# Patient Record
Sex: FEMALE | Race: WHITE | Employment: STUDENT | URBAN - METROPOLITAN AREA
[De-identification: names, ages, dates, MRNs, and addresses within clinical notes are randomized per-mention and may not be internally consistent; named-entity substitution may affect disease eponyms.]

---

## 2017-05-25 ENCOUNTER — HOSPITAL ENCOUNTER (OUTPATIENT)
Dept: PHYSICAL THERAPY | Age: 22
Discharge: HOME | End: 2017-05-25
Payer: COMMERCIAL

## 2017-05-25 PROCEDURE — 97161 PT EVAL LOW COMPLEX 20 MIN: CPT

## 2017-05-25 PROCEDURE — 97110 THERAPEUTIC EXERCISES: CPT

## 2017-05-25 NOTE — PROGRESS NOTES
Ros Del Valle  : 1995 [facility]  [address] [facility], [address] [place] [address],[address], [facility] [address].  [phone]   [phone]       OUTPATIENT PHYSICAL THERAPY:Initial Assessment 2017      ICD-10: Treatment Diagnosis: Pain in right shoulder (M25.511)  Precautions/Allergies:   none  Fall Risk Score: 0 (? 5 = High Risk)  MD Orders: evaluate and treat, HEP, ROM strengthening MEDICAL/REFERRING DIAGNOSIS:  adina shoulder/ core/ eccentric/ adina scapula   DATE OF ONSET: 2017  REFERRING PHYSICIAN: Ren Galvan MD  RETURN PHYSICIAN APPOINTMENT: not known     INITIAL ASSESSMENT:  Ms. Tonya Beck presents with complaints of R shoulder pain after playing softball at college. Pain, rotator cuff and scapula weakness are limiting normalized use and function of Right UE. She will benefit from PT for guided shoulder rehab to promote return to normalized use of the UE with recreational activities. PROBLEM LIST (Impacting functional limitations):  1. Pain right shoulder  2. Weakness right shoulder INTERVENTIONS PLANNED:  1. Thermal and electric modalities, manual therapies for pain   2. Manual therapies, therapeutic exercises, HEP for ROM    3. Therapeutic exercises and HEP for strength   TREATMENT PLAN:  Effective Dates: 17 TO 17. Frequency/Duration: 2 times a week for 4-6 weeks  GOALS: (Goals have been discussed and agreed upon with patient.)  Discharge Goals: Time Frame: 4-6 weeks  1. She will be independent with HEP. 2. She will increase R UE strength to 5/5 with manual muscle testing to return to softball. 3. She will report pain 0/10 with recreational activities. Rehabilitation Potential For Stated Goals: Good  Regarding Donna Mathias's therapy, I certify that the treatment plan above will be carried out by a therapist or under their direction.   Thank you for this referral,      Kellen Napoles, PT       Referring Physician Signature: Ren Mccarthy., MD              Date                      HISTORY:   History of Present Injury/Illness (Reason for Referral):  Ms. Nancy Baptiste plays softball in college and reports shoulder pain that started around December/janaury then started to get worse in February. No injury to the shoulder. Shoulder only seemed to bother her with throwing and was a sharp pain during the throw and then would be a dull pain. She did go see an orthopedic doctor and he referred her to physical therapy. She did not throw for ~3 weeks and then went back to throwing and softball games. Pain was in the anterior shoulder but felt deep inside. .   Past Medical History/Comorbidities:   Ms. Nancy Baptiste  has no past medical history on file. Ms. Nancy Baptiste  has no past surgical history on file. Social History/Living Environment:    Lives with mom and dad  Prior Level of Function/Work/Activity:  Plays softball at [employer] in [facility]. She plays short stop or 2nd base. Season started in January but they did have a few practices in the fall. 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    Dominant Side:         RIGHT  Current Medications:     escitalopram    Date Last Reviewed:  5-25-17   Number of Personal Factors/Comorbidities that affect the Plan of Care: 1-2: MODERATE COMPLEXITY   EXAMINATION:   Observation/Orthostatic Postural Assessment: Pt sits with slightly rounded shoulders     Palpation:No tenderness around the R shoulder    ROM:      LUE AROM  L Shoulder Internal Rotation: 90 (at 90 deg abd, T5 behind back)  L Shoulder External Rotation: 100 (at 90 deg abd)  LUE PROM  L Shoulder Internal Rotation: 85  L Shoulder External Rotation: 115 (at 90 deg abd)    RUE AROM  R Shoulder Internal Rotation: 80 (at 90 deg abd, T6 behind back)  R Shoulder External Rotation: 100  RUE PROM  R Shoulder Internal Rotation: 90  R Shoulder External Rotation: 80 (at neutral, 120 at 90 deg abd)         Strength:   LUE Strength  L Scapular Depression: 5  L Shoulder ADduction: 5  L Shoulder Internal Rotation: 5  L Shoulder External Rotation: 5    RUE Strength  R Scapular Depression: 4- (lower trap)  R Shoulder Flexion: 5  R Shoulder Extension: 5 (prone)  R Shoulder ABduction: 4+  R Shoulder ADduction: 4 (middle trap)  R Shoulder Internal Rotation: 5  R Shoulder External Rotation: 5   subscap lift off 4+      Special Tests:  Impingement tests: negative santa-german, negative Neers; Rotator Cuff tests: negative full can, negative empty can, negative subscap lift off; Labral test: negative O'Brians, negative bicep load test  Functional Mobility:  Sit to/from Stand: independent. Bed Mobility: independent. Car Transfer: indendent. Independent with basic mobility. independent with dressing and grooming ADL's. Body Structures Involved:  1. Joints  2. Muscles Body Functions Affected:  1. Neuromusculoskeletal Activities and Participation Affected:  1. Community, Social and [place]   Number of elements (examined above) that affect the Plan of Care: 3: MODERATE COMPLEXITY   CLINICAL PRESENTATION:   Presentation: Stable and uncomplicated: LOW COMPLEXITY   CLINICAL DECISION MAKING:   Outcome Measure: Tool Used: Disabilities of the Arm, Shoulder and Hand (DASH) Questionnaire - Quick Version  Score:  Initial: 12/55  Most Recent: X/55 (Date: -- )   Interpretation of Score: The DASH is designed to measure the activities of daily living in person's with upper extremity dysfunction or pain. Each section is scored on a 1-5 scale, 5 representing the greatest disability. The scores of each section are added together for a total score of 55. Medical Necessity:   · Patient is expected to demonstrate progress in strength to improve shoulder stability for return to softball. Reason for Services/Other Comments:  · Patient continues to require skilled intervention due to R shoulder pain, decreased UE strength.    Use of outcome tool(s) and clinical judgement create a POC that gives a: Clear prediction of patient's progress: LOW COMPLEXITY            TREATMENT:   (In addition to Assessment/Re-Assessment sessions the following treatments were rendered)  Pre-treatment Symptoms/Complaints:  Pt reports shoulder pain with throwing the ball. Pain: Initial:     0/10, at worst 6-7/10 Post Session:  0/10       Therapeutic Exercise (15 Minutes):  Exercises to improve strength. Required moderate visual, verbal and tactile cues to promote proper body alignment. Instructed in HEP with prone I's, T's, and Y's 2x10 ea with bilateral UE. R shoulder prone ER 1# 2x10, prone IR 1# 2x10. Eccentric shoulder ER supine with green t-band 30x. Serratus punch supine with green band 2x15. Issued green tubing for HEP. Manual Therapy (     ): none  Therapeutic Modalities:                                                                                             HEP: Provided written HEP for the above exercises. patient verbalizes understanding. Treatment/Session Assessment:    · Response to Treatment:  Good return demonstration of HEP. · Compliance with Program/Exercises: Will assess as treatment progresses. · Recommendations/Intent for next treatment session: \"Next visit will focus on rotator cuff and scapular strengthening\".   Total Treatment Duration: 50 minutes  PT Patient Time In/Time Out  Time In: 1350  Time Out: [address]

## 2017-05-25 NOTE — PROGRESS NOTES
Ambulatory/Rehab Services H2 Model Falls Risk Assessment    Risk Factor Pts. ·   Confusion/Disorientation/Impulsivity  []    4 ·   Symptomatic Depression  []   2 ·   Altered Elimination  []   1 ·   Dizziness/Vertigo  []   1 ·   Gender (Male)  []   1 ·   Any administered antiepileptics (anticonvulsants):  []   2 ·   Any administered benzodiazepines:  []   1 ·   Visual Impairment (specify):  []   1 ·   Portable Oxygen Use  []   1 ·   Orthostatic ? BP  []   1 ·   History of Recent Falls (within 3 mos.)  []   5     Ability to Rise from Chair (choose one) Pts. ·   Ability to rise in a single movement  [x]   0 ·   Pushes up, successful in one attempt  []   1 ·   Multiple attempts, but successful  []   3 ·   Unable to rise without assistance  []   4   Total: (5 or greater = High Risk) 0     Falls Prevention Plan:   []                Physical Limitations to Exercise (specify):   []                Mobility Assistance Device (type):   []                Exercise/Equipment Adaptation (specify):    ©2010 [facility] [address] Patent #[id number].  Federal Law prohibits the replication, distribution or use without written permission from [facility] [employer]

## 2017-05-31 ENCOUNTER — HOSPITAL ENCOUNTER (OUTPATIENT)
Dept: PHYSICAL THERAPY | Age: 22
Discharge: HOME | End: 2017-05-31
Payer: COMMERCIAL

## 2017-05-31 PROCEDURE — 97110 THERAPEUTIC EXERCISES: CPT

## 2017-05-31 NOTE — PROGRESS NOTES
Brittany Anderson  : 1995 [facility] at [facility]  [address] [facility], [address] [place] [address],[address], [facility] [address].  [phone]   [phone]       OUTPATIENT PHYSICAL THERAPY:Daily Note 2017      ICD-10: Treatment Diagnosis: Pain in right shoulder (M25.511)  Precautions/Allergies:   none  Fall Risk Score: 0 (? 5 = High Risk)  MD Orders: evaluate and treat, HEP, ROM strengthening MEDICAL/REFERRING DIAGNOSIS:  adina shoulder/ core/ eccentric/ adina scapula   DATE OF ONSET: 2017  REFERRING PHYSICIAN: Ester Mendoza MD  RETURN PHYSICIAN APPOINTMENT: not known     INITIAL ASSESSMENT:  Ms. Areli Smith presents with complaints of R shoulder pain after playing softball at college. Pain, rotator cuff and scapula weakness are limiting normalized use and function of Right UE. She will benefit from PT for guided shoulder rehab to promote return to normalized use of the UE with recreational activities. PROBLEM LIST (Impacting functional limitations):  1. Pain right shoulder  2. Weakness right shoulder INTERVENTIONS PLANNED:  1. Thermal and electric modalities, manual therapies for pain   2. Manual therapies, therapeutic exercises, HEP for ROM    3. Therapeutic exercises and HEP for strength   TREATMENT PLAN:  Effective Dates: 17 TO 17. Frequency/Duration: 2 times a week for 4-6 weeks  GOALS: (Goals have been discussed and agreed upon with patient.)  Discharge Goals: Time Frame: 4-6 weeks  1. She will be independent with HEP. 2. She will increase R UE strength to 5/5 with manual muscle testing to return to softball. 3. She will report pain 0/10 with recreational activities. Rehabilitation Potential For Stated Goals: Good                HISTORY:   History of Present Injury/Illness (Reason for Referral):  Ms. Areli Smith plays softball in college and reports shoulder pain that started around December/ then started to get worse in February. No injury to the shoulder.  Shoulder only seemed to bother her with throwing and was a sharp pain during the throw and then would be a dull pain. She did go see an orthopedic doctor and he referred her to physical therapy. She did not throw for ~3 weeks and then went back to throwing and softball games. Pain was in the anterior shoulder but felt deep inside. .   Past Medical History/Comorbidities:   Ms. Lata Field  has no past medical history on file. Ms. Lata Field  has no past surgical history on file. Social History/Living Environment:    Lives with mom and dad  Prior Level of Function/Work/Activity:  Plays softball at [employer] in [facility]. She plays short stop or 2nd base. Season started in January but they did have a few practices in the fall. 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 Dominant Side:         RIGHT  Current Medications:     escitalopram    Date Last Reviewed:  5-31-17   Number of Personal Factors/Comorbidities that affect the Plan of Care: 1-2: MODERATE COMPLEXITY   EXAMINATION:   Observation/Orthostatic Postural Assessment: Pt sits with slightly rounded shoulders     Palpation:No tenderness around the R shoulder    ROM:                     Strength:            subscap lift off 4+      Special Tests: AT EVAL: Impingement tests: negative santa-german, negative Neers; Rotator Cuff tests: negative full can, negative empty can, negative subscap lift off; Labral test: negative O'Brians, negative bicep load test   CLINICAL DECISION MAKING:   Outcome Measure: Tool Used: Disabilities of the Arm, Shoulder and Hand (DASH) Questionnaire - Quick Version  Score:  Initial: 12/55  Most Recent: X/55 (Date: -- )   Interpretation of Score: The DASH is designed to measure the activities of daily living in person's with upper extremity dysfunction or pain. Each section is scored on a 1-5 scale, 5 representing the greatest disability. The scores of each section are added together for a total score of 55. Medical Necessity:   · Patient is expected to demonstrate progress in strength to improve shoulder stability for return to softball. Reason for Services/Other Comments:  · Patient continues to require skilled intervention due to R shoulder pain, decreased UE strength. Use of outcome tool(s) and clinical judgement create a POC that gives a: Clear prediction of patient's progress: LOW COMPLEXITY            TREATMENT:   (In addition to Assessment/Re-Assessment sessions the following treatments were rendered)  Pre-treatment Symptoms/Complaints:  Pt reports no pain in the shoulder. Pain: Initial:     0/10 Post Session:  0/10       Therapeutic Exercise (40 Minutes):  Exercises to improve strength. Required moderate visual, verbal and tactile cues to promote proper body alignment. Date:  5-31-17 Date:   Date:     Activity/Exercise Parameters Parameters Parameters   Prone I's 1#2x15     Prone T's 2x15     Prone Y's' 2x15     Prone ER and IR 1#15 2#15 ea     Serratus on wall 2x10     Y's on wall with lift off Red 2x15     Eccentric ER at 90 deg abd Green 2x15     IR at 90 deg abd Blue 2x15     Rhythmic stabilization Ball dribbles on wall 30x4     IR and ER at neutral Green 2x15 ea         Manual Therapy (     ): none  Therapeutic Modalities:                                                                                             HEP: Provided written HEP for the above exercises. patient verbalizes understanding. Treatment/Session Assessment:    · Response to Treatment: Fatigued in shoulder muscles at end of treatment. No complaints of pain during treatment. · Compliance with Program/Exercises: Will assess as treatment progresses. · Recommendations/Intent for next treatment session: \"Next visit will focus on rotator cuff and scapular strengthening\".   Total Treatment Duration: 40 minutes  PT Patient Time In/Time Out  Time In: 0945  Time Out: [address], PT

## 2017-06-01 ENCOUNTER — HOSPITAL ENCOUNTER (OUTPATIENT)
Dept: PHYSICAL THERAPY | Age: 22
Discharge: HOME | End: 2017-06-01
Payer: COMMERCIAL

## 2017-06-01 PROCEDURE — 97110 THERAPEUTIC EXERCISES: CPT

## 2017-06-01 NOTE — PROGRESS NOTES
Ashwini Bell  : 1995 [facility] at [facility]  [address] [facility], [address] [place] [address],[address], [facility] [address]  [phone]   [phone]       OUTPATIENT PHYSICAL THERAPY:Daily Note 2017      ICD-10: Treatment Diagnosis: Pain in right shoulder (M25.511)  Precautions/Allergies:   none  Fall Risk Score: 0 (? 5 = High Risk)  MD Orders: evaluate and treat, HEP, ROM strengthening MEDICAL/REFERRING DIAGNOSIS:  adina shoulder/ core/ eccentric/ adina scapula   DATE OF ONSET: 2017  REFERRING PHYSICIAN: Sarthak Chowdary MD  RETURN PHYSICIAN APPOINTMENT: not known     INITIAL ASSESSMENT:  Ms. Abelardo Krishnan presents with complaints of R shoulder pain after playing softball at college. Pain, rotator cuff and scapula weakness are limiting normalized use and function of Right UE. She will benefit from PT for guided shoulder rehab to promote return to normalized use of the UE with recreational activities. PROBLEM LIST (Impacting functional limitations):  1. Pain right shoulder  2. Weakness right shoulder INTERVENTIONS PLANNED:  1. Thermal and electric modalities, manual therapies for pain   2. Manual therapies, therapeutic exercises, HEP for ROM    3. Therapeutic exercises and HEP for strength   TREATMENT PLAN:  Effective Dates: 17 TO 17. Frequency/Duration: 2 times a week for 4-6 weeks  GOALS: (Goals have been discussed and agreed upon with patient.)  Discharge Goals: Time Frame: 4-6 weeks  1. She will be independent with HEP. 2. She will increase R UE strength to 5/5 with manual muscle testing to return to softball. 3. She will report pain 0/10 with recreational activities. Rehabilitation Potential For Stated Goals: Good                HISTORY:   History of Present Injury/Illness (Reason for Referral):  Ms. Abelardo Krishnan plays softball in college and reports shoulder pain that started around December/ then started to get worse in February. No injury to the shoulder.  Shoulder only seemed to bother her with throwing and was a sharp pain during the throw and then would be a dull pain. She did go see an orthopedic doctor and he referred her to physical therapy. She did not throw for ~3 weeks and then went back to throwing and softball games. Pain was in the anterior shoulder but felt deep inside. .   Past Medical History/Comorbidities:   Ms. Sanjiv Cantu  has no past medical history on file. Ms. Sanjiv Cantu  has no past surgical history on file. Social History/Living Environment:    Lives with mom and dad  Prior Level of Function/Work/Activity:  Plays softball at [employer] in [facility]. She plays short stop or 2nd base. Season started in January but they did have a few practices in the fall. 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 Dominant Side:         RIGHT  Current Medications:     escitalopram    Date Last Reviewed:  5-31-17   Number of Personal Factors/Comorbidities that affect the Plan of Care: 1-2: MODERATE COMPLEXITY   EXAMINATION:   Observation/Orthostatic Postural Assessment: Pt sits with slightly rounded shoulders     Palpation:No tenderness around the R shoulder    ROM:                     Strength:            subscap lift off 4+      Special Tests: AT EVAL: Impingement tests: negative santa-german, negative Neers; Rotator Cuff tests: negative full can, negative empty can, negative subscap lift off; Labral test: negative O'Brians, negative bicep load test   CLINICAL DECISION MAKING:   Outcome Measure: Tool Used: Disabilities of the Arm, Shoulder and Hand (DASH) Questionnaire - Quick Version  Score:  Initial: 12/55  Most Recent: X/55 (Date: -- )   Interpretation of Score: The DASH is designed to measure the activities of daily living in person's with upper extremity dysfunction or pain. Each section is scored on a 1-5 scale, 5 representing the greatest disability. The scores of each section are added together for a total score of 55. Medical Necessity:   · Patient is expected to demonstrate progress in strength to improve shoulder stability for return to softball. Reason for Services/Other Comments:  · Patient continues to require skilled intervention due to R shoulder pain, decreased UE strength. Use of outcome tool(s) and clinical judgement create a POC that gives a: Clear prediction of patient's progress: LOW COMPLEXITY            TREATMENT:   (In addition to Assessment/Re-Assessment sessions the following treatments were rendered)  Pre-treatment Symptoms/Complaints:  Pt reports no pain or soreness in the shoulder. Pain: Initial:     0/10 Post Session:  0/10       Therapeutic Exercise (38 Minutes):  Exercises to improve strength. Required moderate visual, verbal and tactile cues to promote proper body alignment. Active isolated stretch to B hip flexors in angela test position 3'x10 ea   Date:  5-31-17 Date:  6-1-17 Date:     Activity/Exercise Parameters Parameters Parameters   Prone I's 1#2x15     Prone T's 2x15 With ball drops 2x30    Prone Y's' 2x15 With ball drops 2x30    Prone ER and IR 1#15 2#15 ea With ball drops 2x30    Serratus strengthening 2x10 on wall plank + on floor 2x10    Y's on wall with lift off Red 2x15 -    Eccentric ER at 90 deg abd Green 2x15 -    IR at 90 deg abd Blue 2x15 -    Rhythmic stabilization Ball dribbles on wall 30x4 Ball dribbles on wall 30x4    IR and ER at neutral Green 2x15 ea -    superman - 30\"x2    core - Side to side with yellow ball 2x20    Side lying ER - Green ball 2x20              Manual Therapy (     ): none  Therapeutic Modalities:                                                                                             HEP: Provided written HEP for the above exercises. patient verbalizes understanding. Treatment/Session Assessment:    · Response to Treatment: Fatigues quickly with scapula strengthening exercises. She has good form with exercises.    · Compliance with Program/Exercises: Will assess as treatment progresses. · Recommendations/Intent for next treatment session: \"Next visit will focus on rotator cuff and scapular strengthening\".   Total Treatment Duration:38 minutes  PT Patient Time In/Time Out  Time In: 1020  Time Out: [address]

## 2017-06-05 ENCOUNTER — APPOINTMENT (OUTPATIENT)
Dept: PHYSICAL THERAPY | Age: 22
End: 2017-06-05
Payer: COMMERCIAL

## 2017-06-07 ENCOUNTER — HOSPITAL ENCOUNTER (OUTPATIENT)
Dept: PHYSICAL THERAPY | Age: 22
Discharge: HOME | End: 2017-06-07
Payer: COMMERCIAL

## 2017-06-07 PROCEDURE — 97110 THERAPEUTIC EXERCISES: CPT

## 2017-06-07 NOTE — PROGRESS NOTES
Josue Romero  : 1995 [facility]  [address] [facility], [address] [place] [address],[address], [address] [facility] [place]  [phone]   [phone]       OUTPATIENT PHYSICAL THERAPY:Daily Note 2017      ICD-10: Treatment Diagnosis: Pain in right shoulder (M25.511)  Precautions/Allergies:   none  Fall Risk Score: 0 (? 5 = High Risk)  MD Orders: evaluate and treat, HEP, ROM strengthening MEDICAL/REFERRING DIAGNOSIS:  adina shoulder/ core/ eccentric/ adina scapula   DATE OF ONSET: 2017  REFERRING PHYSICIAN: Lopez Valentin MD  RETURN PHYSICIAN APPOINTMENT: not known     INITIAL ASSESSMENT:  Ms. Amy Luz presents with complaints of R shoulder pain after playing softball at college. Pain, rotator cuff and scapula weakness are limiting normalized use and function of Right UE. She will benefit from PT for guided shoulder rehab to promote return to normalized use of the UE with recreational activities. PROBLEM LIST (Impacting functional limitations):  1. Pain right shoulder  2. Weakness right shoulder INTERVENTIONS PLANNED:  1. Thermal and electric modalities, manual therapies for pain   2. Manual therapies, therapeutic exercises, HEP for ROM    3. Therapeutic exercises and HEP for strength   TREATMENT PLAN:  Effective Dates: 17 TO 17. Frequency/Duration: 2 times a week for 4-6 weeks  GOALS: (Goals have been discussed and agreed upon with patient.)  Discharge Goals: Time Frame: 4-6 weeks  1. She will be independent with HEP. 2. She will increase R UE strength to 5/5 with manual muscle testing to return to softball. 3. She will report pain 0/10 with recreational activities. Rehabilitation Potential For Stated Goals: Good                HISTORY:   History of Present Injury/Illness (Reason for Referral):  Ms. Amy Luz plays softball in college and reports shoulder pain that started around December/ then started to get worse in February. No injury to the shoulder.  Shoulder only seemed to bother her with throwing and was a sharp pain during the throw and then would be a dull pain. She did go see an orthopedic doctor and he referred her to physical therapy. She did not throw for ~3 weeks and then went back to throwing and softball games. Pain was in the anterior shoulder but felt deep inside. .   Past Medical History/Comorbidities:   Ms. Donis Pickett  has no past medical history on file. Ms. Donis Pickett  has no past surgical history on file. Social History/Living Environment:    Lives with mom and dad  Prior Level of Function/Work/Activity:  Plays softball at "[employer] in [facility]. She plays short stop or 2nd base. Season started in January but they did have a few practices in the fall. 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 Dominant Side:         RIGHT  Current Medications:     escitalopram    Date Last Reviewed:  6-7-17   Number of Personal Factors/Comorbidities that affect the Plan of Care: 1-2: MODERATE COMPLEXITY   EXAMINATION:   Observation/Orthostatic Postural Assessment: Pt sits with slightly rounded shoulders     Palpation:No tenderness around the R shoulder    ROM:                     Strength:                  Special Tests: AT EVAL: Impingement tests: negative santa-german, negative Neers; Rotator Cuff tests: negative full can, negative empty can, negative subscap lift off; Labral test: negative O'Brians, negative bicep load test   CLINICAL DECISION MAKING:   Outcome Measure: Tool Used: Disabilities of the Arm, Shoulder and Hand (DASH) Questionnaire - Quick Version  Score:  Initial: 12/55  Most Recent: X/55 (Date: -- )   Interpretation of Score: The DASH is designed to measure the activities of daily living in person's with upper extremity dysfunction or pain. Each section is scored on a 1-5 scale, 5 representing the greatest disability. The scores of each section are added together for a total score of 55.       Medical Necessity: · Patient is expected to demonstrate progress in strength to improve shoulder stability for return to softball. Reason for Services/Other Comments:  · Patient continues to require skilled intervention due to R shoulder pain, decreased UE strength. Use of outcome tool(s) and clinical judgement create a POC that gives a: Clear prediction of patient's progress: LOW COMPLEXITY            TREATMENT:   (In addition to Assessment/Re-Assessment sessions the following treatments were rendered)  Pre-treatment Symptoms/Complaints:  Pt reports the shoulder has not been bothering her. Pain: Initial:     0/10 Post Session:  0/10       Therapeutic Exercise (40 Minutes):  Exercises to improve strength. Required moderate visual, verbal and tactile cues to promote proper body alignment. Date:  5-31-17 Date:  6-1-17 Date:  6-7-17   Activity/Exercise Parameters Parameters Parameters   Prone I's 1#2x15  1#2x15   Prone T's 2x15 With ball drops 2x30 1#2x15   Prone Y's' 2x15 With ball drops 2x30 1#2x15   Prone ER and IR 1#15 2#15 ea With ball drops 2x30 2#2x15 ea  3#15 ea   Serratus strengthening 2x10 on wall plank + on floor 2x10 5#2x15   Y's on wall with lift off Red 2x15 - Red 2x16   Eccentric ER at 90 deg abd Green 2x15 - Green 2x15   IR at 90 deg abd Blue 2x15 - Blue 2x15   Rhythmic stabilization Ball dribbles on wall 30x4 Ball dribbles on wall 30x4 Ball dribbls on wall 30x4   IR and ER at neutral Green 2x15 ea -    superman - 30\"x2    core - Side to side with yellow ball 2x20 Side to side with yellow ball 2x20; plank side to side 10   Side lying ER - Green ball 2x20 2# 15x, 10   Diagonals on calbles - - Flex 3#10ea  Ext 7# 10 ea       Manual Therapy (     ): none  Therapeutic Modalities:                                                                                             HEP: Provided written HEP for the above exercises. patient verbalizes understanding.     Treatment/Session Assessment:    · Response to Treatment: No complaints of pain with exercises. · Compliance with Program/Exercises: complaint  · Recommendations/Intent for next treatment session: \"Next visit will focus on rotator cuff and scapular strengthening\".   Total Treatment Duration:40 minutes  PT Patient Time In/Time Out  Time In: 1100  Time Out: [address] 5

## 2017-06-08 ENCOUNTER — HOSPITAL ENCOUNTER (OUTPATIENT)
Dept: PHYSICAL THERAPY | Age: 22
Discharge: HOME | End: 2017-06-08
Payer: COMMERCIAL

## 2017-06-08 PROCEDURE — 97110 THERAPEUTIC EXERCISES: CPT

## 2017-06-08 NOTE — PROGRESS NOTES
Josue Romero  : 1995 [facility]  [address] [facility], [address] [place] [address],[address], [facility] [address]  [phone]   [phone]       OUTPATIENT PHYSICAL THERAPY:Daily Note 2017      ICD-10: Treatment Diagnosis: Pain in right shoulder (M25.511)  Precautions/Allergies:   none  Fall Risk Score: 0 (? 5 = High Risk)  MD Orders: evaluate and treat, HEP, ROM strengthening MEDICAL/REFERRING DIAGNOSIS:  adina shoulder/ core/ eccentric/ adina scapula   DATE OF ONSET: 2017  REFERRING PHYSICIAN: Lopez Valentin MD  RETURN PHYSICIAN APPOINTMENT: not known     INITIAL ASSESSMENT:  Ms. Amy Luz presents with complaints of R shoulder pain after playing softball at college. Pain, rotator cuff and scapula weakness are limiting normalized use and function of Right UE. She will benefit from PT for guided shoulder rehab to promote return to normalized use of the UE with recreational activities. PROBLEM LIST (Impacting functional limitations):  1. Pain right shoulder  2. Weakness right shoulder INTERVENTIONS PLANNED:  1. Thermal and electric modalities, manual therapies for pain   2. Manual therapies, therapeutic exercises, HEP for ROM    3. Therapeutic exercises and HEP for strength   TREATMENT PLAN:  Effective Dates: 17 TO 17. Frequency/Duration: 2 times a week for 4-6 weeks  GOALS: (Goals have been discussed and agreed upon with patient.)  Discharge Goals: Time Frame: 4-6 weeks  1. She will be independent with HEP. 2. She will increase R UE strength to 5/5 with manual muscle testing to return to softball. 3. She will report pain 0/10 with recreational activities. Rehabilitation Potential For Stated Goals: Good                HISTORY:   History of Present Injury/Illness (Reason for Referral):  Ms. Amy Luz plays softball in college and reports shoulder pain that started around December/ then started to get worse in February. No injury to the shoulder.  Shoulder only seemed to bother her with throwing and was a sharp pain during the throw and then would be a dull pain. She did go see an orthopedic doctor and he referred her to physical therapy. She did not throw for ~3 weeks and then went back to throwing and softball games. Pain was in the anterior shoulder but felt deep inside. .   Past Medical History/Comorbidities:   Ms. Nancy Baptiste  has no past medical history on file. Ms. Nancy Baptiste  has no past surgical history on file. Social History/Living Environment:    Lives with mom and dad  Prior Level of Function/Work/Activity:  Plays softball at [employer] in [facility]. She plays short stop or 2nd base. Season started in January but they did have a few practices in the fall. 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 Dominant Side:         RIGHT  Current Medications:     escitalopram    Date Last Reviewed:  6-7-17   Number of Personal Factors/Comorbidities that affect the Plan of Care: 1-2: MODERATE COMPLEXITY   EXAMINATION:   Observation/Orthostatic Postural Assessment: Pt sits with slightly rounded shoulders     Palpation:No tenderness around the R shoulder    ROM:                     Strength:                  Special Tests: AT EVAL: Impingement tests: negative santa-german, negative Neers; Rotator Cuff tests: negative full can, negative empty can, negative subscap lift off; Labral test: negative O'Brians, negative bicep load test   CLINICAL DECISION MAKING:   Outcome Measure: Tool Used: Disabilities of the Arm, Shoulder and Hand (DASH) Questionnaire - Quick Version  Score:  Initial: 12/55  Most Recent: X/55 (Date: -- )   Interpretation of Score: The DASH is designed to measure the activities of daily living in person's with upper extremity dysfunction or pain. Each section is scored on a 1-5 scale, 5 representing the greatest disability. The scores of each section are added together for a total score of 55.       Medical Necessity: · Patient is expected to demonstrate progress in strength to improve shoulder stability for return to softball. Reason for Services/Other Comments:  · Patient continues to require skilled intervention due to R shoulder pain, decreased UE strength. Use of outcome tool(s) and clinical judgement create a POC that gives a: Clear prediction of patient's progress: LOW COMPLEXITY            TREATMENT:   (In addition to Assessment/Re-Assessment sessions the following treatments were rendered)  Pre-treatment Symptoms/Complaints:  Pt reports no soreness in the shoulder today. Pain: Initial:     0/10 Post Session:  0/10       Therapeutic Exercise (40 Minutes):  Exercises to improve strength. Required moderate visual, verbal and tactile cues to promote proper body alignment.     Date:  5-31-17 Date:  6-1-17 Date:  6-7-17 Date  6-8-17   Activity/Exercise Parameters Parameters Parameters parameters   Prone I's 1#2x15  1#2x15    Prone T's 2x15 With ball drops 2x30 1#2x15 With ball drops 2x30   Prone Y's' 2x15 With ball drops 2x30 1#2x15 With ball drops 2x30   Prone ER and IR 1#15 2#15 ea With ball drops 2x30 2#2x15 ea  3#15 ea Green ball drops ER 3x20   Serratus strengthening 2x10 on wall plank + on floor 2x10 5#2x15 Plank with hip/knee flex 2x10   Y's on wall with lift off Red 2x15 - Red 2x16    Eccentric ER at 90 deg abd Green 2x15 - Green 2x15 Green 2x15   IR at 90 deg abd Blue 2x15 - Blue 2x15 Green 2x15 at 90 flex   Rhythmic stabilization [employer] on wall 30x4 Ball dribbles on wall 30x4 Ball dribbls on wall 30x4 Ball dribbles on wall 30x4   IR and ER at neutral Green 2x15 ea -     superman - 30\"x2     core - Side to side with yellow ball 2x20 Side to side with yellow ball 2x20; plank side to side 10 Slides side to side in plank; side plank 20\"x2   Side lying ER - Green ball 2x20 2# 15x, 10 Green ball 2x20   Diagonals on cables - - Flex 3#10ea  Ext 7# 10 ea MR 2x10    D2 extension eccentric with toss - - - 2x10 Manual Therapy (     ): none  Therapeutic Modalities:                                                                                             HEP: Provided written HEP for the above exercises. patient verbalizes understanding. Treatment/Session Assessment:    · Response to Treatment: She seems to be progressing with strength in R UE.   · Compliance with Program/Exercises: complaint  · Recommendations/Intent for next treatment session: \"Next visit will focus on rotator cuff and scapular strengthening\".   Total Treatment Duration:40 minutes  PT Patient Time In/Time Out  Time In: 1015  Time Out: [address] [facility]

## 2017-06-12 ENCOUNTER — HOSPITAL ENCOUNTER (OUTPATIENT)
Dept: PHYSICAL THERAPY | Age: 22
Discharge: HOME | End: 2017-06-12
Payer: COMMERCIAL

## 2017-06-12 PROCEDURE — 97110 THERAPEUTIC EXERCISES: CPT

## 2017-06-12 NOTE — PROGRESS NOTES
Latia Harris  : 1995 [facility] at [facility]  [address] [facility], [address] [place] [address],[address], [place]  [phone]   [phone]       OUTPATIENT PHYSICAL THERAPY:Daily Note 2017      ICD-10: Treatment Diagnosis: Pain in right shoulder (M25.511)  Precautions/Allergies:   none  Fall Risk Score: 0 (? 5 = High Risk)  MD Orders: evaluate and treat, HEP, ROM strengthening MEDICAL/REFERRING DIAGNOSIS:  adina shoulder/ core/ eccentric/ adina scapula   DATE OF ONSET: 2017  REFERRING PHYSICIAN: Yasmine Keys MD  RETURN PHYSICIAN APPOINTMENT: not known     INITIAL ASSESSMENT:  Ms. Loni Boxer presents with complaints of R shoulder pain after playing softball at college. Pain, rotator cuff and scapula weakness are limiting normalized use and function of Right UE. She will benefit from PT for guided shoulder rehab to promote return to normalized use of the UE with recreational activities. PROBLEM LIST (Impacting functional limitations):  1. Pain right shoulder  2. Weakness right shoulder INTERVENTIONS PLANNED:  1. Thermal and electric modalities, manual therapies for pain   2. Manual therapies, therapeutic exercises, HEP for ROM    3. Therapeutic exercises and HEP for strength   TREATMENT PLAN:  Effective Dates: 17 TO 17. Frequency/Duration: 2 times a week for 4-6 weeks  GOALS: (Goals have been discussed and agreed upon with patient.)  Discharge Goals: Time Frame: 4-6 weeks  1. She will be independent with HEP. 2. She will increase R UE strength to 5/5 with manual muscle testing to return to softball. 3. She will report pain 0/10 with recreational activities. Rehabilitation Potential For Stated Goals: Good                HISTORY:   History of Present Injury/Illness (Reason for Referral):  Ms. Loni Boxer plays softball in college and reports shoulder pain that started around December/ then started to get worse in February. No injury to the shoulder.  Shoulder only seemed to bother her with throwing and was a sharp pain during the throw and then would be a dull pain. She did go see an orthopedic doctor and he referred her to physical therapy. She did not throw for ~3 weeks and then went back to throwing and softball games. Pain was in the anterior shoulder but felt deep inside. .   Past Medical History/Comorbidities:   Ms. Tiff Miller  has no past medical history on file. Ms. Tiff Miller  has no past surgical history on file. Social History/Living Environment:    Lives with mom and dad  Prior Level of Function/Work/Activity:  Plays softball at [employer] in [facility]. She plays short stop or 2nd base. Season started in January but they did have a few practices in the fall. 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 Dominant Side:         RIGHT  Current Medications:     escitalopram    Date Last Reviewed:  6-12-17   Number of Personal Factors/Comorbidities that affect the Plan of Care: 1-2: MODERATE COMPLEXITY   EXAMINATION:   Observation/Orthostatic Postural Assessment: Pt sits with slightly rounded shoulders     Palpation:No tenderness around the R shoulder    ROM:                     Strength:        RUE Strength  R Scapular Depression: 4+  R Shoulder ADduction: 4+  R Shoulder Internal Rotation: 5  R Shoulder External Rotation: 5         Special Tests: AT EVAL: Impingement tests: negative santa-german, negative Neers; Rotator Cuff tests: negative full can, negative empty can, negative subscap lift off; Labral test: negative O'Brians, negative bicep load test   CLINICAL DECISION MAKING:   Outcome Measure: Tool Used: Disabilities of the Arm, Shoulder and Hand (DASH) Questionnaire - Quick Version  Score:  Initial: 12/55  Most Recent: X/55 (Date: -- )   Interpretation of Score: The DASH is designed to measure the activities of daily living in person's with upper extremity dysfunction or pain.   Each section is scored on a 1-5 scale, 5 representing the greatest disability. The scores of each section are added together for a total score of 55. Medical Necessity:   · Patient is expected to demonstrate progress in strength to improve shoulder stability for return to softball. Reason for Services/Other Comments:  · Patient continues to require skilled intervention due to R shoulder pain, decreased UE strength. Use of outcome tool(s) and clinical judgement create a POC that gives a: Clear prediction of patient's progress: LOW COMPLEXITY            TREATMENT:   (In addition to Assessment/Re-Assessment sessions the following treatments were rendered)  Pre-treatment Symptoms/Complaints:  Pt reports no new complaint  Pain: Initial:     0/10 Post Session:  0/10       Therapeutic Exercise (40 Minutes):  Exercises to improve strength. Required moderate visual, verbal and tactile cues to promote proper body alignment.    Tonda Collet toss to trampoline 2x10   Date:  5-31-17 Date:  6-1-17 Date:  6-7-17 Date  6-8-17 Date  6-12-17   Activity/Exercise Parameters Parameters Parameters parameters parameters   Prone I's 1#2x15  1#2x15  1# 2x15   Prone T's 2x15 With ball drops 2x30 1#2x15 With ball drops 2x30 1#2x15   Prone Y's' 2x15 With ball drops 2x30 1#2x15 With ball drops 2x30 1#2x15   Prone ER and IR 1#15 2#15 ea With ball drops 2x30 2#2x15 ea  3#15 ea Green ball drops ER 3x20 3#2x15 ea   Serratus strengthening 2x10 on wall plank + on floor 2x10 5#2x15 Plank with hip/knee flex 2x10 Plank + on floor 2x10   Y's on wall with lift off Red 2x15 - Red 2x16  Red 2x15   Eccentric ER at 90 deg abd Green 2x15 - Green 2x15 Green 2x15 Red concentric 2x15   IR at 90 deg abd Blue 2x15 - Blue 2x15 Green 2x15 at 90 flex    Rhythmic stabilization [employer] on wall 30x4 Ball dribbles on wall 30x4 Ball dribbls on wall 30x4 Ball dribbles on wall 30x4 Ball dribbles on wall 30X4    IR and ER at neutral Green 2x15 ea -   Green 2x15   superman - 30\"x2      core - Side to side with yellow ball 2x20 Side to side with yellow ball 2x20; plank side to side 10 Slides side to side in plank; side plank 20\"x2 Side to side blue 2x20; side plank 30\" ea   Side lying ER - Green ball 2x20 2# 15x, 10 Green ball 2x20 Green 2x20   Diagonals on cables - - Flex 3#10ea  Ext 7# 10 ea MR 2x10     D2 extension eccentric with toss - - - 2x10 2x10       Manual Therapy (     ): none  Therapeutic Modalities:                                                                                             HEP: Provided written HEP for the above exercises. patient verbalizes understanding. Treatment/Session Assessment:    · Response to Treatment: She has progressed with UE strength and may be ready to start throwing program next session. · Compliance with Program/Exercises: complaint  · Recommendations/Intent for next treatment session: \"Next visit will focus on rotator cuff and scapular strengthening\".   Total Treatment Duration:40 minutes  PT Patient Time In/Time Out  Time In: 1355  Time Out: [address]

## 2017-06-14 ENCOUNTER — HOSPITAL ENCOUNTER (OUTPATIENT)
Dept: PHYSICAL THERAPY | Age: 22
Discharge: HOME | End: 2017-06-14
Payer: COMMERCIAL

## 2017-06-14 PROCEDURE — 97110 THERAPEUTIC EXERCISES: CPT

## 2017-06-14 NOTE — PROGRESS NOTES
Derrick Herrmann  : 1995 [facility] at [facility] ISI [facility]  [address] [facility], [address] [place] [address],[address], [facility] [address].  [phone]   [phone]       OUTPATIENT PHYSICAL THERAPY:Daily Note 2017      ICD-10: Treatment Diagnosis: Pain in right shoulder (M25.511)  Precautions/Allergies:   none  Fall Risk Score: 0 (? 5 = High Risk)  MD Orders: evaluate and treat, HEP, ROM strengthening MEDICAL/REFERRING DIAGNOSIS:  adina shoulder/ core/ eccentric/ adina scapula   DATE OF ONSET: 2017  REFERRING PHYSICIAN: Christy Mckeon MD  RETURN PHYSICIAN APPOINTMENT: not known     INITIAL ASSESSMENT:  Ms. Andrea Magaña presents with complaints of R shoulder pain after playing softball at college. Pain, rotator cuff and scapula weakness are limiting normalized use and function of Right UE. She will benefit from PT for guided shoulder rehab to promote return to normalized use of the UE with recreational activities. PROBLEM LIST (Impacting functional limitations):  1. Pain right shoulder  2. Weakness right shoulder INTERVENTIONS PLANNED:  1. Thermal and electric modalities, manual therapies for pain   2. Manual therapies, therapeutic exercises, HEP for ROM    3. Therapeutic exercises and HEP for strength   TREATMENT PLAN:  Effective Dates: 17 TO 17. Frequency/Duration: 2 times a week for 4-6 weeks  GOALS: (Goals have been discussed and agreed upon with patient.)  Discharge Goals: Time Frame: 4-6 weeks  1. She will be independent with HEP. 2. She will increase R UE strength to 5/5 with manual muscle testing to return to softball. 3. She will report pain 0/10 with recreational activities. Rehabilitation Potential For Stated Goals: Good                HISTORY:   History of Present Injury/Illness (Reason for Referral):  Ms. Andrea Magaña plays softball in college and reports shoulder pain that started around December/ then started to get worse in February. No injury to the shoulder.  Shoulder only seemed to bother her with throwing and was a sharp pain during the throw and then would be a dull pain. She did go see an orthopedic doctor and he referred her to physical therapy. She did not throw for ~3 weeks and then went back to throwing and softball games. Pain was in the anterior shoulder but felt deep inside. .   Past Medical History/Comorbidities:   Ms. Sita Villarreal  has no past medical history on file. Ms. Sita Villarreal  has no past surgical history on file. Social History/Living Environment:    Lives with mom and dad  Prior Level of Function/Work/Activity:  Plays softball at [employer] in [facility]. She plays short stop or 2nd base. Season started in January but they did have a few practices in the fall. 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 Dominant Side:         RIGHT  Current Medications:     escitalopram    Date Last Reviewed:  6-12-17   Number of Personal Factors/Comorbidities that affect the Plan of Care: 1-2: MODERATE COMPLEXITY   EXAMINATION:   Observation/Orthostatic Postural Assessment: Pt sits with slightly rounded shoulders     Palpation:No tenderness around the R shoulder    ROM:                     Strength:                  Special Tests: AT EVAL: Impingement tests: negative santa-german, negative Neers; Rotator Cuff tests: negative full can, negative empty can, negative subscap lift off; Labral test: negative O'Brians, negative bicep load test   CLINICAL DECISION MAKING:   Outcome Measure: Tool Used: Disabilities of the Arm, Shoulder and Hand (DASH) Questionnaire - Quick Version  Score:  Initial: 12/55  Most Recent: X/55 (Date: -- )   Interpretation of Score: The DASH is designed to measure the activities of daily living in person's with upper extremity dysfunction or pain. Each section is scored on a 1-5 scale, 5 representing the greatest disability. The scores of each section are added together for a total score of 55.       Medical Necessity:   · Patient is expected to demonstrate progress in strength to improve shoulder stability for return to softball. Reason for Services/Other Comments:  · Patient continues to require skilled intervention due to R shoulder pain, decreased UE strength. Use of outcome tool(s) and clinical judgement create a POC that gives a: Clear prediction of patient's progress: LOW COMPLEXITY            TREATMENT:   (In addition to Assessment/Re-Assessment sessions the following treatments were rendered)  Pre-treatment Symptoms/Complaints:  Pt reports no shoulder pain. Pain: Initial:     0/10 Post Session:  0/10       Therapeutic Exercise (30 Minutes):  Exercises to improve strength. Required moderate visual, verbal and tactile cues to promote proper body alignment. Started phase 1 of the throwing program today. Performed stretches to the R UE to wrist, elbow, and shoulder between sets.       Date:  5-31-17 Date:  6-1-17 Date:  6-7-17 Date  6-8-17 Date  6-12-17 Date  6-14-17   Activity/Exercise Parameters Parameters Parameters parameters parameters parameters   Prone I's 1#2x15  1#2x15  1# 2x15    Prone T's 2x15 With ball drops 2x30 1#2x15 With ball drops 2x30 1#2x15    Prone Y's' 2x15 With ball drops 2x30 1#2x15 With ball drops 2x30 1#2x15    Prone ER and IR 1#15 2#15 ea With ball drops 2x30 2#2x15 ea  3#15 ea Green ball drops ER 3x20 3#2x15 ea    Serratus strengthening 2x10 on wall plank + on floor 2x10 5#2x15 Plank with hip/knee flex 2x10 Plank + on floor 2x10    Y's on wall with lift off Red 2x15 - Red 2x16  Red 2x15    Eccentric ER at 90 deg abd Green 2x15 - Green 2x15 Green 2x15 Red concentric 2x15    IR at 90 deg abd Blue 2x15 - Blue 2x15 Green 2x15 at 90 flex     Rhythmic stabilization [employer] on wall 30x4 Ball dribbles on wall 30x4 Ball dribbls on wall 30x4 Ball dribbles on wall 30x4 Ball dribbles on wall 30X4     IR and ER at neutral Green 2x15 ea -   Green 2x15 Green 2x15 ea   superman - 30\"x2 core - Side to side with yellow ball 2x20 Side to side with yellow ball 2x20; plank side to side 10 Slides side to side in plank; side plank 20\"x2 Side to side blue 2x20; side plank 30\" ea    Side lying ER - Green ball 2x20 2# 15x, 10 Green ball 2x20 Green 2x20    Diagonals on cables - - Flex 3#10ea  Ext 7# 10 ea MR 2x10      D2 extension eccentric with toss - - - 2x10 2x10        Manual Therapy (     ): none  Therapeutic Modalities:                                                                                             HEP: Provided written HEP for the above exercises. patient verbalizes understanding. Treatment/Session Assessment:    · Response to Treatment: No complaints of pain with starting the throwing program today. · Compliance with Program/Exercises: complaint  · Recommendations/Intent for next treatment session: \"Next visit will focus on rotator cuff and scapular strengthening\".   Total Treatment Duration:30 minutes  PT Patient Time In/Time Out  Time In: 7919  Time Out: Lynsey Salmon 182, PT

## 2017-06-20 ENCOUNTER — HOSPITAL ENCOUNTER (OUTPATIENT)
Dept: PHYSICAL THERAPY | Age: 22
Discharge: HOME | End: 2017-06-20
Payer: COMMERCIAL

## 2017-06-20 PROCEDURE — 97110 THERAPEUTIC EXERCISES: CPT

## 2017-06-20 NOTE — PROGRESS NOTES
Coreen Arredondo  : 1995 [facility],  Lynsey Pineda, [address] [place] [address]  [phone]   [phone]       OUTPATIENT PHYSICAL THERAPY:Daily Note 2017      ICD-10: Treatment Diagnosis: Pain in right shoulder (M25.511)  Precautions/Allergies:   none  Fall Risk Score: 0 (? 5 = High Risk)  MD Orders: evaluate and treat, HEP, ROM strengthening MEDICAL/REFERRING DIAGNOSIS:  adina shoulder/ core/ eccentric/ adina scapula   DATE OF ONSET: 2017  REFERRING PHYSICIAN: Suzanne Sauer MD  RETURN PHYSICIAN APPOINTMENT: 2017     INITIAL ASSESSMENT:  Ms. Donis Pickett presents with complaints of R shoulder pain after playing softball at college. Pain, rotator cuff and scapula weakness are limiting normalized use and function of Right UE. She will benefit from PT for guided shoulder rehab to promote return to normalized use of the UE with recreational activities. PROBLEM LIST (Impacting functional limitations):  1. Pain right shoulder  2. Weakness right shoulder INTERVENTIONS PLANNED:  1. Thermal and electric modalities, manual therapies for pain   2. Manual therapies, therapeutic exercises, HEP for ROM    3. Therapeutic exercises and HEP for strength   TREATMENT PLAN:  Effective Dates: 17 TO 17. Frequency/Duration: 2 times a week for 4-6 weeks  GOALS: (Goals have been discussed and agreed upon with patient.)  Discharge Goals: Time Frame: 4-6 weeks  1. She will be independent with HEP. 2. She will increase R UE strength to 5/5 with manual muscle testing to return to softball. 3. She will report pain 0/10 with recreational activities. Rehabilitation Potential For Stated Goals: Good                HISTORY:   History of Present Injury/Illness (Reason for Referral):  Ms. Donis Pickett plays softball in college and reports shoulder pain that started around December/ then started to get worse in February. No injury to the shoulder.  Shoulder only seemed to bother her with throwing and was a sharp pain during the throw and then would be a dull pain. She did go see an orthopedic doctor and he referred her to physical therapy. She did not throw for ~3 weeks and then went back to throwing and softball games. Pain was in the anterior shoulder but felt deep inside. .   Past Medical History/Comorbidities:   Ms. Nancy Baptiste  has no past medical history on file. Ms. Nancy Baptiste  has no past surgical history on file. Social History/Living Environment:    Lives with mom and dad  Prior Level of Function/Work/Activity:  Plays softball at [employer] in [facility]. She plays short stop or 2nd base. Season started in January but they did have a few practices in the fall. 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 Dominant Side:         RIGHT  Current Medications:     escitalopram    Date Last Reviewed:  6-20-17   Number of Personal Factors/Comorbidities that affect the Plan of Care: 1-2: MODERATE COMPLEXITY   EXAMINATION:   Observation/Orthostatic Postural Assessment: Pt sits with slightly rounded shoulders     Palpation:No tenderness around the R shoulder    ROM:                     Strength:                  Special Tests: AT EVAL: Impingement tests: negative santa-german, negative Neers; Rotator Cuff tests: negative full can, negative empty can, negative subscap lift off; Labral test: negative O'Brians, negative bicep load test   CLINICAL DECISION MAKING:   Outcome Measure: Tool Used: Disabilities of the Arm, Shoulder and Hand (DASH) Questionnaire - Quick Version  Score:  Initial: 12/55  Most Recent: X/55 (Date: -- )   Interpretation of Score: The DASH is designed to measure the activities of daily living in person's with upper extremity dysfunction or pain. Each section is scored on a 1-5 scale, 5 representing the greatest disability. The scores of each section are added together for a total score of 55.       Medical Necessity:   · Patient is expected to demonstrate progress in strength to improve shoulder stability for return to softball. Reason for Services/Other Comments:  · Patient continues to require skilled intervention due to R shoulder pain, decreased UE strength. Use of outcome tool(s) and clinical judgement create a POC that gives a: Clear prediction of patient's progress: LOW COMPLEXITY            TREATMENT:   (In addition to Assessment/Re-Assessment sessions the following treatments were rendered)  Pre-treatment Symptoms/Complaints:  Pt reports her shoulder was a little sore the next day after throwing. She did not throw on Saturday. Pain: Initial:     0/10 Post Session:  0/10       Therapeutic Exercise (45 Minutes):  Exercises to improve strength. Required moderate visual, verbal and tactile cues to promote proper body alignment. Started phase 1 of the throwing program today. Performed stretches to the R UE to wrist, elbow, and shoulder between sets.       Date:  5-31-17 Date:  6-1-17 Date:  6-7-17 Date  6-8-17 Date  6-12-17 Date  6-14-17   Activity/Exercise Parameters Parameters Parameters parameters parameters parameters   Prone I's 1#2x15  1#2x15  1# 2x15 1#2x15   Prone T's 2x15 With ball drops 2x30 1#2x15 With ball drops 2x30 1#2x15 1#2x15   Prone Y's' 2x15 With ball drops 2x30 1#2x15 With ball drops 2x30 1#2x15 1#2x15   Prone ER and IR 1#15 2#15 ea With ball drops 2x30 2#2x15 ea  3#15 ea Green ball drops ER 3x20 3#2x15 ea 3# 2x15 ea   Serratus strengthening 2x10 on wall plank + on floor 2x10 5#2x15 Plank with hip/knee flex 2x10 Plank + on floor 2x10 Plank with alt hip/knee flexion 2x10   Y's on wall with lift off Red 2x15 - Red 2x16  Red 2x15 Red 2x15   Eccentric ER at 90 deg abd Green 2x15 - Green 2x15 Green 2x15 Red concentric 2x15 Red concentric 2x15   IR at 90 deg abd Blue 2x15 - Blue 2x15 Green 2x15 at 90 flex     Rhythmic stabilization Ball dribbles on wall 30x4 Ball dribbles on wall 30x4 [employer] dribbls on wall 30x4 Ball dribbles on wall 30x4 Ball dribbles on wall 30X4  Ball dribbles on wall 30x4   IR and ER at neutral Green 2x15 ea -   Green 2x15 Green 2x15 ea   superman - 30\"x2       core - Side to side with yellow ball 2x20 Side to side with yellow ball 2x20; plank side to side 10 Slides side to side in plank; side plank 20\"x2 Side to side blue 2x20; side plank 30\" ea Side plank 30\"B, turning with cables 10# 10x   Side lying ER - Green ball 2x20 2# 15x, 10 Green ball 2x20 Green 2x20 2# 2x15   Diagonals on cables - - Flex 3#10ea  Ext 7# 10 ea MR 2x10   Flex 3# 15 ea   D2 extension eccentric with toss - - - 2x10 2x10        Manual Therapy (     ): none  Therapeutic Modalities:                                                                                             HEP: Provided written HEP for the above exercises. patient verbalizes understanding. Treatment/Session Assessment:    · Response to Treatment: No complaints of pain with throwing today. She seems to be progressing with UE strength. · Compliance with Program/Exercises: complaint  · Recommendations/Intent for next treatment session: \"Next visit will focus on rotator cuff and scapular strengthening\".   Total Treatment Duration:45 minutes  PT Patient Time In/Time Out  Time In: 1300  Time Out: [address]

## 2017-06-23 ENCOUNTER — HOSPITAL ENCOUNTER (OUTPATIENT)
Dept: PHYSICAL THERAPY | Age: 22
Discharge: HOME | End: 2017-06-23
Payer: COMMERCIAL

## 2017-06-23 PROCEDURE — 97110 THERAPEUTIC EXERCISES: CPT

## 2017-06-23 NOTE — PROGRESS NOTES
Shanthi Raines  : 1995 Therapy [facility] at [facility]  [address] [facility] [place], [address] [place] [address],[address], [address] [facility]  [phone]   [phone]       OUTPATIENT PHYSICAL THERAPY:Daily Note 2017      ICD-10: Treatment Diagnosis: Pain in right shoulder (M25.511)  Precautions/Allergies:   none  Fall Risk Score: 0 (? 5 = High Risk)  MD Orders: evaluate and treat, HEP, ROM strengthening MEDICAL/REFERRING DIAGNOSIS:  adina shoulder/ core/ eccentric/ adina scapula   DATE OF ONSET: 2017  REFERRING PHYSICIAN: Ashley Brewster MD  RETURN PHYSICIAN APPOINTMENT: 2017     INITIAL ASSESSMENT:  Ms. Fili Matthew presents with complaints of R shoulder pain after playing softball at college. Pain, rotator cuff and scapula weakness are limiting normalized use and function of Right UE. She will benefit from PT for guided shoulder rehab to promote return to normalized use of the UE with recreational activities. PROBLEM LIST (Impacting functional limitations):  1. Pain right shoulder  2. Weakness right shoulder INTERVENTIONS PLANNED:  1. Thermal and electric modalities, manual therapies for pain   2. Manual therapies, therapeutic exercises, HEP for ROM    3. Therapeutic exercises and HEP for strength   TREATMENT PLAN:  Effective Dates: 17 TO 17. Frequency/Duration: 2 times a week for 4-6 weeks  GOALS: (Goals have been discussed and agreed upon with patient.)  Discharge Goals: Time Frame: 4-6 weeks  1. She will be independent with HEP. 2. She will increase R UE strength to 5/5 with manual muscle testing to return to softball. 3. She will report pain 0/10 with recreational activities. Rehabilitation Potential For Stated Goals: Good                HISTORY:   History of Present Injury/Illness (Reason for Referral):  Ms. Fili Matthew plays softball in college and reports shoulder pain that started around December/ then started to get worse in February. No injury to the shoulder.  Shoulder only seemed to bother her with throwing and was a sharp pain during the throw and then would be a dull pain. She did go see an orthopedic doctor and he referred her to physical therapy. She did not throw for ~3 weeks and then went back to throwing and softball games. Pain was in the anterior shoulder but felt deep inside. .   Past Medical History/Comorbidities:   Ms. Basia Buck  has no past medical history on file. Ms. Basia Buck  has no past surgical history on file. Social History/Living Environment:    Lives with mom and dad  Prior Level of Function/Work/Activity:  Plays softball at [employer] in [facility]. She plays short stop or 2nd base. Season started in January but they did have a few practices in the fall. 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 Dominant Side:         RIGHT  Current Medications:     escitalopram    Date Last Reviewed:  6-20-17   Number of Personal Factors/Comorbidities that affect the Plan of Care: 1-2: MODERATE COMPLEXITY   EXAMINATION:   Observation/Orthostatic Postural Assessment: Pt sits with slightly rounded shoulders     Palpation:No tenderness around the R shoulder    ROM:                     Strength:                  Special Tests: AT EVAL: Impingement tests: negative santa-german, negative Neers; Rotator Cuff tests: negative full can, negative empty can, negative subscap lift off; Labral test: negative O'Brians, negative bicep load test   CLINICAL DECISION MAKING:   Outcome Measure: Tool Used: Disabilities of the Arm, Shoulder and Hand (DASH) Questionnaire - Quick Version  Score:  Initial: 12/55  Most Recent: X/55 (Date: -- )   Interpretation of Score: The DASH is designed to measure the activities of daily living in person's with upper extremity dysfunction or pain. Each section is scored on a 1-5 scale, 5 representing the greatest disability. The scores of each section are added together for a total score of 55.       Medical Necessity:   · Patient is expected to demonstrate progress in strength to improve shoulder stability for return to softball. Reason for Services/Other Comments:  · Patient continues to require skilled intervention due to R shoulder pain, decreased UE strength. Use of outcome tool(s) and clinical judgement create a POC that gives a: Clear prediction of patient's progress: LOW COMPLEXITY            TREATMENT:   (In addition to Assessment/Re-Assessment sessions the following treatments were rendered)  Pre-treatment Symptoms/Complaints:  Pt reports her shoulder was less sore after throwing this time. Pain: Initial:     0/10 Post Session:  0/10       Therapeutic Exercise (45 Minutes):  Exercises to improve strength. Required moderate visual, verbal and tactile cues to promote proper body alignment. Throwing step 3 today. Performed stretches to the R UE to wrist, elbow, and shoulder between sets.       Date:  5-31-17 Date:  6-1-17 Date:  6-7-17 Date  6-8-17 Date  6-12-17 Date  6-14-17 Date  6-23-17   Activity/Exercise Parameters Parameters Parameters parameters parameters parameters parameter   Prone I's 1#2x15  1#2x15  1# 2x15 1#2x15 1#2x15   Prone T's 2x15 With ball drops 2x30 1#2x15 With ball drops 2x30 1#2x15 1#2x15 1#2x14   Prone Y's' 2x15 With ball drops 2x30 1#2x15 With ball drops 2x30 1#2x15 1#2x15 1#2x15   Prone ER and IR 1#15 2#15 ea With ball drops 2x30 2#2x15 ea  3#15 ea Green ball drops ER 3x20 3#2x15 ea 3# 2x15 ea 3#2x15   Serratus strengthening 2x10 on wall plank + on floor 2x10 5#2x15 Plank with hip/knee flex 2x10 Plank + on floor 2x10 Plank with alt hip/knee flexion 2x10 Plank+2x15   Y's on wall with lift off Red 2x15 - Red 2x16  Red 2x15 Red 2x15    Eccentric ER at 90 deg abd Green 2x15 - Green 2x15 Green 2x15 Red concentric 2x15 Red concentric 2x15 Red 2x15   IR at 90 deg abd Blue 2x15 - Blue 2x15 Green 2x15 at 90 flex      Rhythmic stabilization Ball dribbles on wall 30x4 Ball dribbles on wall 30x4 Ball dribbls on wall 30x4 Ball dribbles on wall 30x4 Ball dribbles on wall 30X4  Ball dribbles on wall 30x4 Ball dribbles 30\"x4   IR and ER at neutral Green 2x15 ea -   Green 2x15 Green 2x15 ea    superman - 30\"x2        core - Side to side with yellow ball 2x20 Side to side with yellow ball 2x20; plank side to side 10 Slides side to side in plank; side plank 20\"x2 Side to side blue 2x20; side plank 30\" ea Side plank 30\"B, turning with cables 10# 10x Side plank 45\"B   Side lying ER - Green ball 2x20 2# 15x, 10 Green ball 2x20 Green 2x20 2# 2x15    Diagonals on cables - - Flex 3#10ea  Ext 7# 10 ea MR 2x10   Flex 3# 15 ea Flex 3#15 ea   D2 extension eccentric with toss - - - 2x10 2x10         Manual Therapy (     ): none  Therapeutic Modalities:                                                                                             HEP: Provided written HEP for the above exercises. patient verbalizes understanding. Treatment/Session Assessment:    · Response to Treatment: No complaints of pain with throwing today. She fatigues with UE strengthening exercises. · Compliance with Program/Exercises: complaint  · Recommendations/Intent for next treatment session: \"Next visit will focus on rotator cuff and scapular strengthening\".   Total Treatment Duration:45 minutes  PT Patient Time In/Time Out  Time In: 4770  Time Out: Rei Osborne

## 2017-06-26 ENCOUNTER — HOSPITAL ENCOUNTER (OUTPATIENT)
Dept: PHYSICAL THERAPY | Age: 22
Discharge: HOME | End: 2017-06-26
Payer: COMMERCIAL

## 2017-06-26 PROCEDURE — 97110 THERAPEUTIC EXERCISES: CPT

## 2017-06-26 NOTE — PROGRESS NOTES
Fredy Mathias  : 1995 Therapy Center at [facility]  [facility], [address] [place] [address],[address], [facility] [address].  [phone]   [phone]       OUTPATIENT PHYSICAL THERAPY:Daily Note and Progress Report 2017      ICD-10: Treatment Diagnosis: Pain in right shoulder (M25.511)  Precautions/Allergies:   none  Fall Risk Score: 0 (? 5 = High Risk)  MD Orders: evaluate and treat, HEP, ROM strengthening MEDICAL/REFERRING DIAGNOSIS:  adina shoulder/ core/ eccentric/ adina scapula   DATE OF ONSET: 2017  REFERRING PHYSICIAN: Gabbi King MD  RETURN PHYSICIAN APPOINTMENT: 2017     INITIAL ASSESSMENT:  Ms. Magaly Peraza presents with complaints of R shoulder pain after playing softball at college. She has progressed with UE strength and started the return to throwing program. She is currently in the 1st phase of the return to throwing program and has minimal complaints of pain or soreness. She will benefit from continue physical therapy to continue to progress UE strength. PROBLEM LIST (Impacting functional limitations):  1. Pain right shoulder  2. Weakness right shoulder INTERVENTIONS PLANNED:  1. Thermal and electric modalities, manual therapies for pain   2. Manual therapies, therapeutic exercises, HEP for ROM    3. Therapeutic exercises and HEP for strength   TREATMENT PLAN:  Effective Dates: 17 TO 17. Frequency/Duration: 2 times a week for 4-6 weeks  GOALS: (Goals have been discussed and agreed upon with patient.)  Discharge Goals: Time Frame: 4-6 weeks  1. She will be independent with HEP. Progressing towards  2. She will increase R UE strength to 5/5 with manual muscle testing to return to softball. Progressing towards  3. She will report pain 0/10 with recreational activities.   Progressing towards  Rehabilitation Potential For Stated Goals: Good                HISTORY:   History of Present Injury/Illness (Reason for Referral):  Ms. Magaly Preaza plays softball in college and reports shoulder pain that started around December/janaury then started to get worse in February. No injury to the shoulder. Shoulder only seemed to bother her with throwing and was a sharp pain during the throw and then would be a dull pain. She did go see an orthopedic doctor and he referred her to physical therapy. She did not throw for ~3 weeks and then went back to throwing and softball games. Pain was in the anterior shoulder but felt deep inside. .   Past Medical History/Comorbidities:   Ms. Ann Perez  has no past medical history on file. Ms. Ann Perez  has no past surgical history on file. Social History/Living Environment:    Lives with mom and dad  Prior Level of Function/Work/Activity:  Plays softball at [employer] in [facility]. She plays short stop or 2nd base. Season started in January but they did have a few practices in the fall. 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 Dominant Side:         RIGHT  Current Medications:     escitalopram    Date Last Reviewed:  6-20-17   Number of Personal Factors/Comorbidities that affect the Plan of Care: 1-2: MODERATE COMPLEXITY   EXAMINATION:   Observation/Orthostatic Postural Assessment: Pt sits with slightly rounded shoulders     Palpation:No tenderness around the R shoulder    ROM:                     Strength:        RUE Strength  R Scapular Depression: 4+  R Shoulder ABduction: 4+  R Shoulder ADduction: 4+  R Shoulder Internal Rotation: 4+ (after throwing)  R Shoulder External Rotation: 4+ (after throwing)         Special Tests: AT EVAL: Impingement tests: negative santa-german, negative Neers; Rotator Cuff tests: negative full can, negative empty can, negative subscap lift off; Labral test: negative O'Brians, negative bicep load test   CLINICAL DECISION MAKING:   Outcome Measure:    Tool Used: Disabilities of the Arm, Shoulder and Hand (DASH) Questionnaire - Quick Version  Score:  Initial: 12/55  Most Recent: X/55 (Date: -- )   Interpretation of Score: The DASH is designed to measure the activities of daily living in person's with upper extremity dysfunction or pain. Each section is scored on a 1-5 scale, 5 representing the greatest disability. The scores of each section are added together for a total score of 55. Medical Necessity:   · Patient is expected to demonstrate progress in strength to improve shoulder stability for return to softball. Reason for Services/Other Comments:  · Patient continues to require skilled intervention due to R shoulder pain, decreased UE strength. Use of outcome tool(s) and clinical judgement create a POC that gives a: Clear prediction of patient's progress: LOW COMPLEXITY            TREATMENT:   (In addition to Assessment/Re-Assessment sessions the following treatments were rendered)  Pre-treatment Symptoms/Complaints:  Pt reports no soreness in the shoulder after throwing last session. Pain: Initial:     0/10 Post Session:  0/10       Therapeutic Exercise (45 Minutes):  Exercises to improve strength. Required moderate visual, verbal and tactile cues to promote proper body alignment. Throwing step 4 today. Performed stretches to the R UE to wrist, elbow, and shoulder between sets.       Date  6-8-17 Date  6-12-17 Date  6-14-17 Date  6-23-17 Date  6-26-17   Activity/Exercise parameters parameters parameters parameter parameters   Prone I's  1# 2x15 1#2x15 1#2x15 1#2x15   Prone T's With ball drops 2x30 1#2x15 1#2x15 1#2x14 1#2x15   Prone Y's' With ball drops 2x30 1#2x15 1#2x15 1#2x15 1#2x15   Prone ER and IR Green ball drops ER 3x20 3#2x15 ea 3# 2x15 ea 3#2x15 3#2x15   Serratus strengthening Plank with hip/knee flex 2x10 Plank + on floor 2x10 Plank with alt hip/knee flexion 2x10 Plank+2x15    Y's on wall with lift off  Red 2x15 Red 2x15     Eccentric ER at 90 deg abd Green 2x15 Red concentric 2x15 Red concentric 2x15 Red 2x15 Red 2x15   IR at 90 deg abd Green 2x15 at 90 flex Rhythmic stabilization Ball dribbles on wall 30x4 Ball dribbles on wall 30X4  Ball dribbles on wall 30x4 Ball dribbles 30\"x4    IR and ER at neutral  [facility] 2x15 ea     superman        core Slides side to side in plank; side plank 20\"x2 Side to side blue 2x20; side plank 30\" ea Side plank 30\"B, turning with cables 10# 10x Side plank 45\"B    Side lying ER Green ball 2x20 Green 2x20 2# 2x15     Diagonals on cables MR 2x10   Flex 3# 15 ea Flex 3#15 ea    D2 extension eccentric with toss 2x10 2x10          Manual Therapy (     ): none  Therapeutic Modalities:                                                                                             HEP: Provided written HEP for the above exercises. patient verbalizes understanding. Treatment/Session Assessment:    · Response to Treatment: Tested rotator cuff strength after throwing and seemed weaker than prior testing of IR and ER. Rotator cuff may fatigue with throwing and need an increase in endurance. · Compliance with Program/Exercises: complaint  · Recommendations/Intent for next treatment session: \"Next visit will focus on rotator cuff and scapular strengthening\".   Total Treatment Duration:45 minutes  PT Patient Time In/Time Out  Time In: 1100  Time Out: [facility]

## 2017-06-29 ENCOUNTER — HOSPITAL ENCOUNTER (OUTPATIENT)
Dept: PHYSICAL THERAPY | Age: 22
Discharge: HOME | End: 2017-06-29
Payer: COMMERCIAL

## 2017-06-29 PROCEDURE — 97110 THERAPEUTIC EXERCISES: CPT

## 2017-06-29 NOTE — PROGRESS NOTES
Thelda Flow  : 1995 Therapy Center at [facility]  [address] [facility], [address] [place] [address],[address], [place] [address]  [phone]   [phone]       OUTPATIENT PHYSICAL THERAPY:Daily Note 2017      ICD-10: Treatment Diagnosis: Pain in right shoulder (M25.511)  Precautions/Allergies:   none  Fall Risk Score: 0 (? 5 = High Risk)  MD Orders: evaluate and treat, HEP, ROM strengthening MEDICAL/REFERRING DIAGNOSIS:  adina shoulder/ core/ eccentric/ adina scapula   DATE OF ONSET: 2017  REFERRING PHYSICIAN: Jacob Cameron MD  RETURN PHYSICIAN APPOINTMENT: 2017     INITIAL ASSESSMENT:  Ms. Rosa Maria Figueredo presents with complaints of R shoulder pain after playing softball at college. She has progressed with UE strength and started the return to throwing program. She is currently in the 1st phase of the return to throwing program and has minimal complaints of pain or soreness. She will benefit from continue physical therapy to continue to progress UE strength. PROBLEM LIST (Impacting functional limitations):  1. Pain right shoulder  2. Weakness right shoulder INTERVENTIONS PLANNED:  1. Thermal and electric modalities, manual therapies for pain   2. Manual therapies, therapeutic exercises, HEP for ROM    3. Therapeutic exercises and HEP for strength   TREATMENT PLAN:  Effective Dates: 17 TO 17. Frequency/Duration: 2 times a week for 4-6 weeks  GOALS: (Goals have been discussed and agreed upon with patient.)  Discharge Goals: Time Frame: 4-6 weeks  1. She will be independent with HEP. Progressing towards  2. She will increase R UE strength to 5/5 with manual muscle testing to return to softball. Progressing towards  3. She will report pain 0/10 with recreational activities.   Progressing towards  Rehabilitation Potential For Stated Goals: Good                HISTORY:   History of Present Injury/Illness (Reason for Referral):  Ms. Rosa Maria Figueredo plays softball in college and reports shoulder pain that started around December/janaury then started to get worse in February. No injury to the shoulder. Shoulder only seemed to bother her with throwing and was a sharp pain during the throw and then would be a dull pain. She did go see an orthopedic doctor and he referred her to physical therapy. She did not throw for ~3 weeks and then went back to throwing and softball games. Pain was in the anterior shoulder but felt deep inside. .   Past Medical History/Comorbidities:   Ms. Britton Foster  has no past medical history on file. Ms. Britton Foster  has no past surgical history on file. Social History/Living Environment:    Lives with mom and dad  Prior Level of Function/Work/Activity:  Plays softball at [employer] in [facility]. She plays short stop or 2nd base. Season started in January but they did have a few practices in the fall. 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 Dominant Side:         RIGHT  Current Medications:     escitalopram    Date Last Reviewed:  6-29-17   Number of Personal Factors/Comorbidities that affect the Plan of Care: 1-2: MODERATE COMPLEXITY   EXAMINATION:   Observation/Orthostatic Postural Assessment: Pt sits with slightly rounded shoulders     Palpation:No tenderness around the R shoulder    ROM:                     Strength:                  Special Tests: AT EVAL: Impingement tests: negative santa-german, negative Neers; Rotator Cuff tests: negative full can, negative empty can, negative subscap lift off; Labral test: negative O'Brians, negative bicep load test   CLINICAL DECISION MAKING:   Outcome Measure: Tool Used: Disabilities of the Arm, Shoulder and Hand (DASH) Questionnaire - Quick Version  Score:  Initial: 12/55  Most Recent: X/55 (Date: -- )   Interpretation of Score: The DASH is designed to measure the activities of daily living in person's with upper extremity dysfunction or pain.   Each section is scored on a 1-5 scale, 5 representing the greatest disability. The scores of each section are added together for a total score of 55. Medical Necessity:   · Patient is expected to demonstrate progress in strength to improve shoulder stability for return to softball. Reason for Services/Other Comments:  · Patient continues to require skilled intervention due to R shoulder pain, decreased UE strength. Use of outcome tool(s) and clinical judgement create a POC that gives a: Clear prediction of patient's progress: LOW COMPLEXITY            TREATMENT:   (In addition to Assessment/Re-Assessment sessions the following treatments were rendered)  Pre-treatment Symptoms/Complaints:  Pt reports she plans to throw this afternoon  Pain: Initial:     0/10 Post Session:  0/10       Therapeutic Exercise (40 Minutes):  Exercises to improve strength. Required moderate visual, verbal and tactile cues to promote proper body alignment. Active isolated stretches for ER at neutral, ER at 90 deg abd, IR, forward elevation, abduction 3\"x10 ea  Reviewed softball HEP that was issued from [employer].       Date  6-8-17 Date  6-12-17 Date  6-14-17 Date  6-23-17 Date  6-26-17 Date  6-29-17   Activity/Exercise parameters parameters parameters parameter parameters parameters   Prone I's  1# 2x15 1#2x15 1#2x15 1#2x15    Prone T's With ball drops 2x30 1#2x15 1#2x15 1#2x14 1#2x15 Ball drops 2x20 1/2kg ball   Prone Y's' With ball drops 2x30 1#2x15 1#2x15 1#2x15 1#2x15 Ball drops 2x20 1/2 kg ball   Prone ER and IR Green ball drops ER 3x20 3#2x15 ea 3# 2x15 ea 3#2x15 3#2x15 Ball drops 2x20   Serratus strengthening Plank with hip/knee flex 2x10 Plank + on floor 2x10 Plank with alt hip/knee flexion 2x10 Plank+2x15  8# 2x15   Y's on wall with lift off  Red 2x15 Red 2x15   Red 2x15   Eccentric ER at 90 deg abd Green 2x15 Red concentric 2x15 Red concentric 2x15 Red 2x15 Red 2x15 Red 2x15   IR at 90 deg abd Green 2x15 at 90 flex     Green 2x15   Rhythmic stabilization [employer] dribbles on wall 30x4 Ball dribbles on wall 30X4  Ball dribbles on wall 30x4 Ball dribbles 30\"x4     IR and ER at neutral  [employer] 2x15 ea      superman         core Slides side to side in plank; side plank 20\"x2 Side to side blue 2x20; side plank 30\" ea Side plank 30\"B, turning with cables 10# 10x Side plank 45\"B  Sit to side with ball 2x20   Side lying ER Green ball 2x20 Green 2x20 2# 2x15   Ball toss 2x20   Diagonals on cables MR 2x10   Flex 3# 15 ea Flex 3#15 ea  Flex bar 10 ea   D2 extension eccentric with toss 2x10 2x10    2x15       Manual Therapy (     ): none  Therapeutic Modalities:                                                                                             HEP: Provided written HEP for the above exercises. patient verbalizes understanding. Treatment/Session Assessment:    · Response to Treatment: No complaints of pain with exercises. · Compliance with Program/Exercises: complaint  · Recommendations/Intent for next treatment session: \"Next visit will focus on rotator cuff and scapular strengthening\".   Total Treatment Duration:40 minutes  PT Patient Time In/Time Out  Time In: 0935  Time Out: [address]

## 2017-07-13 ENCOUNTER — HOSPITAL ENCOUNTER (OUTPATIENT)
Dept: PHYSICAL THERAPY | Age: 22
Discharge: HOME | End: 2017-07-13
Payer: COMMERCIAL

## 2017-07-13 PROCEDURE — 97110 THERAPEUTIC EXERCISES: CPT

## 2017-07-13 NOTE — PROGRESS NOTES
Aly Mathias  : 1995 Therapy Center at [facility]  [facility], [address] [place] [address],[address], [place] [address].  [phone]   [phone]       OUTPATIENT PHYSICAL THERAPY:Daily Note and Recertification       ICD-10: Treatment Diagnosis: Pain in right shoulder (M25.511)  Precautions/Allergies:   none  Fall Risk Score: 0 (? 5 = High Risk)  MD Orders: evaluate and treat, HEP, ROM strengthening MEDICAL/REFERRING DIAGNOSIS:  adina shoulder/ core/ eccentric/ adina scapula   DATE OF ONSET: 2017  REFERRING PHYSICIAN: Mey Yao MD  RETURN PHYSICIAN APPOINTMENT: 2017     INITIAL ASSESSMENT:  Ms. Leidy Meza presents with complaints of R shoulder pain after playing softball at college. She has progressed with UE strength and started the return to throwing program. She will benefit from 1-2 more physical therapy sessions to review and update HEP. PROBLEM LIST (Impacting functional limitations):  1. Pain right shoulder  2. Weakness right shoulder INTERVENTIONS PLANNED:  1. Thermal and electric modalities, manual therapies for pain   2. Manual therapies, therapeutic exercises, HEP for ROM    3. Therapeutic exercises and HEP for strength   TREATMENT PLAN:  Effective Dates: 17 to 17. Frequency/Duration: 1-2 more visits  GOALS: (Goals have been discussed and agreed upon with patient.)  Discharge Goals: Time Frame: 4-6 weeks  1. She will be independent with HEP. GOAL MET  2. She will increase R UE strength to 5/5 with manual muscle testing to return to softball. GOAL MET  3. She will report pain 0/10 with recreational activities. GOAL MET  Rehabilitation Potential For Stated Goals: Good  Regarding Donna Mathias's therapy, I certify that the treatment plan above will be carried out by a therapist or under their direction.   Thank you for this referral,    Ariel Pickett, PT       Referring Physician Signature: Mey Yao MD          Date HISTORY:   History of Present Injury/Illness (Reason for Referral):  Ms. Ar Ryder plays softball in college and reports shoulder pain that started around December/janaury then started to get worse in February. No injury to the shoulder. Shoulder only seemed to bother her with throwing and was a sharp pain during the throw and then would be a dull pain. She did go see an orthopedic doctor and he referred her to physical therapy. She did not throw for ~3 weeks and then went back to throwing and softball games. Pain was in the anterior shoulder but felt deep inside. .   Past Medical History/Comorbidities:   Ms. Ar Ryder  has no past medical history on file. Ms. Ar Ryder  has no past surgical history on file. Social History/Living Environment:    Lives with mom and dad  Prior Level of Function/Work/Activity:  Plays softball at [employer] in [facility]. She plays short stop or 2nd base. Season started in January but they did have a few practices in the fall. 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 Dominant Side:         RIGHT  Current Medications:     escitalopram    Date Last Reviewed:  7-13-17   Number of Personal Factors/Comorbidities that affect the Plan of Care: 1-2: MODERATE COMPLEXITY   EXAMINATION:   Observation/Orthostatic Postural Assessment: Pt sits with slightly rounded shoulders     Palpation:No tenderness around the R shoulder    ROM:                     Strength:        RUE Strength  R Scapular Depression: 5  R Shoulder Flexion: 5  R Shoulder Extension: 5  R Shoulder ABduction: 5  R Shoulder ADduction: 5  R Shoulder Internal Rotation: 5  R Shoulder External Rotation: 5         Special Tests: negative full can, negative subscap lift off   CLINICAL DECISION MAKING:   Outcome Measure:    Tool Used: Disabilities of the Arm, Shoulder and Hand (DASH) Questionnaire - Quick Version  Score:  Initial: 12/55  Most Recent: X/55 (Date: -- )   Interpretation of Score: The DASH is designed to measure the activities of daily living in person's with upper extremity dysfunction or pain. Each section is scored on a 1-5 scale, 5 representing the greatest disability. The scores of each section are added together for a total score of 55. Medical Necessity:   · Patient is expected to demonstrate progress in strength to improve shoulder stability for return to softball. Reason for Services/Other Comments:  · Patient continues to require skilled intervention due to R shoulder pain, decreased UE strength. Use of outcome tool(s) and clinical judgement create a POC that gives a: Clear prediction of patient's progress: LOW COMPLEXITY            TREATMENT:   (In addition to Assessment/Re-Assessment sessions the following treatments were rendered)  Pre-treatment Symptoms/Complaints:  Pt reports she has been working on the throwing program and the shoulder feels good with that. Pain: Initial:     0/10 Post Session:  0/10       Therapeutic Exercise (30 Minutes):  Exercises to improve strength. Required moderate visual, verbal and tactile cues to promote proper body alignment.    Active isolated stretches for ER at neutral, ER at 90 deg abd, IR, forward elevation, abduction 3\"x10 ea  Reviewed throwing program and issued [facility] throwing program to start in august.      Date  6-8-17 Date  6-12-17 Date  6-14-17 Date  6-23-17 Date  6-26-17 Date  6-29-17 Date  7-13-17   Activity/Exercise parameters parameters parameters parameter parameters parameters parameters   Prone I's  1# 2x15 1#2x15 1#2x15 1#2x15     Prone T's With ball drops 2x30 1#2x15 1#2x15 1#2x14 1#2x15 Ball drops 2x20 1/2kg ball Ball drops 2x20   Prone Y's' With ball drops 2x30 1#2x15 1#2x15 1#2x15 1#2x15 Ball drops 2x20 1/2 kg ball Ball drops 2x20    Prone ER and IR Green ball drops ER 3x20 3#2x15 ea 3# 2x15 ea 3#2x15 3#2x15 Ball drops 2x20 Ball drops 2x20 3#2x15   Serratus strengthening Plank with hip/knee flex 2x10 Plank + on floor 2x10 Plank with alt hip/knee flexion 2x10 Plank+2x15  8# 2x15    Y's on wall with lift off  Red 2x15 Red 2x15   Red 2x15    Eccentric ER at 90 deg abd Green 2x15 Red concentric 2x15 Red concentric 2x15 Red 2x15 Red 2x15 Red 2x15    IR at 90 deg abd Green 2x15 at 90 flex     Green 2x15    Rhythmic stabilization [employer] on wall 30x4 Ball dribbles on wall 30X4  Ball dribbles on wall 30x4 Ball dribbles 30\"x4   Ball dribbles on wall 30x4   IR and ER at neutral  [employer] 2x15 ea       superman          core Slides side to side in plank; side plank 20\"x2 Side to side blue 2x20; side plank 30\" ea Side plank 30\"B, turning with cables 10# 10x Side plank 45\"B  Sit to side with ball 2x20    Side lying ER Green ball 2x20 Green 2x20 2# 2x15   Ball toss 2x20    Diagonals on cables MR 2x10   Flex 3# 15 ea Flex 3#15 ea  Flex bar 10 ea    D2 extension eccentric with toss 2x10 2x10    2x15        Manual Therapy (     ): none  Therapeutic Modalities:                                                                                             HEP: Provided written HEP for the above exercises. patient verbalizes understanding. Treatment/Session Assessment:    · Response to Treatment: She has progressed with UE strength. · Compliance with Program/Exercises: complaint  · Recommendations/Intent for next treatment session: \"Next visit will focus on rotator cuff and scapular strengthening\".   Total Treatment Duration:30 minutes  PT Patient Time In/Time Out  Time In: 0048  Time Out: [facility]

## 2017-10-11 NOTE — PROGRESS NOTES
Maite Diane  : 1995 [facility]  [address] [facility], [address] [place] [address],[address], [facility] [address].  [phone]   [phone]       OUTPATIENT PHYSICAL THERAPY:Discharge 10/11/2017      ICD-10: Treatment Diagnosis: Pain in right shoulder (M25.511)  Precautions/Allergies:   none  Fall Risk Score: 0 (? 5 = High Risk)  MD Orders: evaluate and treat, HEP, ROM strengthening MEDICAL/REFERRING DIAGNOSIS:  adina shoulder/ core/ eccentric/ adina scapula   DATE OF ONSET: 2017  REFERRING PHYSICIAN: Blaire Heath MD     Ms. Carlos Magana attended 12 physical therapy appointments from 17 to 17. ASSESSMENT:  Ms. Carlos Magana presents with complaints of R shoulder pain after playing softball at college. She progressed with UE strength and decreased pain. She is ready to continue the throwing program on her own and issued HEP to continue strengthening on her own. PLAN:  Discharge patient from physical therapy,     Thank you for this referral,   Anna Diaz, PT    GOALS: (Goals have been discussed and agreed upon with patient.)  Discharge Goals: Time Frame: 4-6 weeks  1. She will be independent with HEP. GOAL MET  2. She will increase R UE strength to 5/5 with manual muscle testing to return to softball. GOAL MET  3. She will report pain 0/10 with recreational activities. GOAL MET  Rehabilitation Potential For Stated Goals: Good                    HISTORY:   History of Present Injury/Illness (Reason for Referral):  Ms. Carlos Magana plays softball in college and reports shoulder pain that started around  then started to get worse in February. No injury to the shoulder. Shoulder only seemed to bother her with throwing and was a sharp pain during the throw and then would be a dull pain. She did go see an orthopedic doctor and he referred her to physical therapy. She did not throw for ~3 weeks and then went back to throwing and softball games.  Pain was in the anterior shoulder but felt deep inside. [place]       EXAMINATION:   Observation/Orthostatic Postural Assessment: Pt sits with slightly rounded shoulders     Palpation:No tenderness around the R shoulder    Strength:        RUE Strength  R Scapular Depression: 5  R Shoulder Flexion: 5  R Shoulder Extension: 5  R Shoulder ABduction: 5  R Shoulder ADduction: 5  R Shoulder Internal Rotation: 5  R Shoulder External Rotation: 5         Special Tests: negative full can, negative subscap lift off